# Patient Record
Sex: MALE | Race: WHITE | NOT HISPANIC OR LATINO | Employment: FULL TIME | URBAN - METROPOLITAN AREA
[De-identification: names, ages, dates, MRNs, and addresses within clinical notes are randomized per-mention and may not be internally consistent; named-entity substitution may affect disease eponyms.]

---

## 2017-01-31 ENCOUNTER — OFFICE VISIT (OUTPATIENT)
Dept: PSYCHIATRY | Facility: CLINIC | Age: 32
End: 2017-01-31
Payer: COMMERCIAL

## 2017-01-31 DIAGNOSIS — R69 DIAGNOSIS DEFERRED: Primary | ICD-10-CM

## 2017-01-31 PROCEDURE — 90834 PSYTX W PT 45 MINUTES: CPT | Mod: S$GLB,,, | Performed by: SOCIAL WORKER

## 2017-02-03 ENCOUNTER — OFFICE VISIT (OUTPATIENT)
Dept: INTERNAL MEDICINE | Facility: CLINIC | Age: 32
End: 2017-02-03

## 2017-02-03 VITALS
OXYGEN SATURATION: 98 % | WEIGHT: 250.69 LBS | HEIGHT: 69 IN | HEART RATE: 87 BPM | DIASTOLIC BLOOD PRESSURE: 98 MMHG | SYSTOLIC BLOOD PRESSURE: 154 MMHG | BODY MASS INDEX: 37.13 KG/M2

## 2017-02-03 DIAGNOSIS — I10 ESSENTIAL HYPERTENSION: ICD-10-CM

## 2017-02-03 DIAGNOSIS — F32.A DEPRESSION, UNSPECIFIED DEPRESSION TYPE: Primary | ICD-10-CM

## 2017-02-03 PROCEDURE — 99999 PR PBB SHADOW E&M-EST. PATIENT-LVL III: CPT | Mod: PBBFAC,,, | Performed by: INTERNAL MEDICINE

## 2017-02-03 PROCEDURE — 99204 OFFICE O/P NEW MOD 45 MIN: CPT | Mod: S$PBB,,, | Performed by: INTERNAL MEDICINE

## 2017-02-03 PROCEDURE — 99213 OFFICE O/P EST LOW 20 MIN: CPT | Mod: PBBFAC | Performed by: INTERNAL MEDICINE

## 2017-02-03 RX ORDER — BUPROPION HYDROCHLORIDE 150 MG/1
150 TABLET ORAL DAILY
Qty: 30 TABLET | Refills: 3 | Status: SHIPPED | OUTPATIENT
Start: 2017-02-03 | End: 2017-03-06 | Stop reason: SDUPTHER

## 2017-02-03 RX ORDER — AMLODIPINE BESYLATE 5 MG/1
5 TABLET ORAL DAILY
Qty: 30 TABLET | Refills: 3 | Status: SHIPPED | OUTPATIENT
Start: 2017-02-03 | End: 2017-03-06 | Stop reason: SDUPTHER

## 2017-02-03 NOTE — MR AVS SNAPSHOT
[facility]  [address] [facility] [facility] [address]  Phone: [phone]  Fax: [phone]                  Alpesh Lujan   2/3/2017 11:00 AM   Office Visit    Description:  Male : 1985   Provider:  Kristin Cash MD   Department:  [facility] Internal Medicine           Reason for Visit     Establish Care           Diagnoses this Visit        Comments    Essential hypertension    -  Primary     Depression, unspecified depression type                To Do List           Goals (5 Years of Data)     None       These Medications        Disp Refills Start End    amlodipine (NORVASC) 5 MG tablet 30 tablet 3 2/3/2017 3/5/2017    Take 1 tablet (5 mg total) by mouth once daily. - Oral    Pharmacy: [facility] - [place], [place] - [address] Ph #: [phone]       buPROPion (WELLBUTRIN XL) 150 MG TB24 tablet 30 tablet 3 2/3/2017 3/5/2017    Take 1 tablet (150 mg total) by mouth once daily. - Oral    Pharmacy: [facility] - [address] Ph #: [phone]         [facility] On [facility]     [facility] -  Assistance  Registered nurses in the [facility] provide clinical advisement, health education, appointment booking, and other advisory services.  Call for this free service at [phone].             Medications           Message regarding Medications     Verify the changes and/or additions to your medication regime listed below are the same as discussed with your clinician today.  If any of these changes or additions are incorrect, please notify your healthcare provider.        START taking these NEW medications        Refills    amlodipine (NORVASC) 5 MG tablet 3    Sig: Take 1 tablet (5 mg total) by mouth once daily.    Class: Normal    Route: Oral    buPROPion (WELLBUTRIN XL) 150 MG TB24 tablet 3    Sig: Take 1 tablet (150 mg total) by mouth once daily.    Class: Normal     "Route: Oral           Verify that the below list of medications is an accurate representation of the medications you are currently taking.  If none reported, the list may be blank. If incorrect, please contact your healthcare provider. Carry this list with you in case of emergency.           Current Medications     amlodipine (NORVASC) 5 MG tablet Take 1 tablet (5 mg total) by mouth once daily.    buPROPion (WELLBUTRIN XL) 150 MG TB24 tablet Take 1 tablet (150 mg total) by mouth once daily.           Clinical Reference Information           Your Vitals Were     BP Pulse Height Weight SpO2 BMI    154/98 87 5' 9" (1.753 m) 113.7 kg (250 lb 10.6 oz) 98% 37.02 kg/m2      Blood Pressure          Most Recent Value    BP  (!)  154/98      Allergies as of 2/3/2017     Pcn [Penicillins]      Immunizations Administered on Date of Encounter - 2/3/2017     None      [facility] Sign-Up     Activating your [facility] account is as easy as 1-2-3!     1) Visit [email or web address], select Sign Up Now, enter this activation code and your date of birth, then select Next.  [email or web address]  Expires: 3/4/2017  3:07 PM      2) Create a username and password to use when you visit [facility] in the future and select a security question in case you lose your password and select Next.    3) Enter your e-mail address and click Sign Up!    Additional Information  If you have questions, please e-mail [email or web address] or call [phone] to talk to our [facility] staff. Remember, [facility] is NOT to be used for urgent needs. For medical emergencies, dial 911.         Language Assistance Services     ATTENTION: Language assistance services are available, free of charge. Please call [phone].      ATENCIÓN: Si habla byron, tiene a clark disposición servicios gratuitos de asistencia lingüística. Llame al [phone].     CHÚ Ý: N?u b?n nói Ti?ng Vi?t, có các d?ch v? h? tr? ngôn ng? mi?n phí dành cho b?n. G?i s? [phone].         [facility] complies with applicable Federal civil rights laws and does not discriminate on the basis of race, color, national origin, age, disability, or sex.

## 2017-02-03 NOTE — PROGRESS NOTES
Internal Medicine    Subjective:      Patient ID: Alpesh Lujan is a 31 y.o. male.    Chief Complaint: Establish Care    HPI Comments: Presents to establish care.  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     Depression:  Patient has experienced recent depression after break-up with fiance and then failing the step 1 exam.  He endorses depressed mood, anhedonia, insomnia (no difficulty falling asleep, but difficulty staying asleep due to nightmares regarding current stressors), increased appetite with 30 lb weight gain, poor concentration (mostly only when outside of the hospital; functioning well inside the hospital), frequent ruminating, and hopelessness.  He has had times where he wishes everything would just end, however he has never had thoughts of harming himself - he states he could never do this to his family.  He occasionally feels anxious but does not feel this is a major problem for him - not having panic attacks or frequent worrying/catastrophizing.  Mother with depression and anxiety - previously on SSRI which was not very helpful.  Maternal grandmother and great-grandfather with depression.  Paternal aunt with bipolar disorder.  No family history of suicide.    HTN:  Patient reports persistently high BP's on his physicals during the last few years - usually 140s -150s systolic.  He is interested in starting medication for this.      R/o DEIDRE:  Patient concerned he has DEIDRE because he snores, has high blood pressure, and his parents have this.      Review of Systems   Constitutional: Negative for chills, fatigue and fever.   HENT: Negative for congestion, sore throat and trouble swallowing.    Eyes: Negative for visual disturbance.   Respiratory: Negative for cough and shortness of breath.    Cardiovascular: Negative for chest pain and palpitations.   Gastrointestinal: Negative for abdominal pain, constipation and diarrhea.   Genitourinary: Negative for dysuria and urgency.   Musculoskeletal: Negative for arthralgias  "and myalgias.   Skin: Negative for rash.   Neurological: Negative for dizziness, seizures, weakness, numbness and headaches.   Hematological: Negative for adenopathy.   Psychiatric/Behavioral: Negative for suicidal ideas.        As per HPI       Past Medical History   Diagnosis Date    Depression      Past Surgical History   Procedure Laterality Date    Knee surgery Right      x2     Family History   Problem Relation Age of Onset    Anxiety disorder Mother     Depression Mother     Obesity Mother     Hypertension Father     Diabetes Father     Hyperlipidemia Father     Obesity Father     Anxiety disorder Sister     Hypertension Sister     Obesity Sister     Dementia Paternal Grandmother     Stroke Paternal Grandfather     Heart disease Paternal Grandfather      Social History   Substance Use Topics    Smoking status: Never Smoker    Smokeless tobacco: Never Used    Alcohol use Yes      Comment: 2-3 drinks per night, 6-12 pack on weekends       Medications and allergies reviewed.     Objective:     Visit Vitals    BP (!) 154/98    Pulse 87    Ht 5' 9" (1.753 m)    Wt 113.7 kg (250 lb 10.6 oz)    SpO2 98%    BMI 37.02 kg/m2     Physical Exam   Constitutional: He is oriented to person, place, and time. He appears well-developed and well-nourished. No distress.   HENT:   Head: Normocephalic and atraumatic.   Eyes: Conjunctivae and EOM are normal. No scleral icterus.   Cardiovascular: Normal rate and regular rhythm.  Exam reveals no gallop and no friction rub.    No murmur heard.  Pulmonary/Chest: Effort normal and breath sounds normal. No respiratory distress. He has no wheezes. He has no rales.   Musculoskeletal: He exhibits no edema.   Neurological: He is alert and oriented to person, place, and time. No cranial nerve deficit.   Skin: No rash noted.   Psychiatric:   Mood is "down"; Affect congruent.  Thoughts linear, speech normal, no suicidal ideation.         Assessment:     1. Depression, " unspecified depression type    2. Essential hypertension        Plan:   Alpesh was seen today for establish care.    Diagnoses and all orders for this visit:    Depression, unspecified depression type  -     buPROPion (WELLBUTRIN XL) 150 MG TB24 tablet; Take 1 tablet (150 mg total) by mouth once daily.    Essential hypertension  -     amlodipine (NORVASC) 5 MG tablet; Take 1 tablet (5 mg total) by mouth once daily.    Patient would like to defer annual physical with routine labs due to not having insurance currently.      Return in 2 weeks (on 2/16/2017).    Kristin Cash MD  Internal Medicine  [facility]

## 2017-02-16 ENCOUNTER — OFFICE VISIT (OUTPATIENT)
Dept: INTERNAL MEDICINE | Facility: CLINIC | Age: 32
End: 2017-02-16
Payer: COMMERCIAL

## 2017-02-16 VITALS
HEIGHT: 66 IN | SYSTOLIC BLOOD PRESSURE: 145 MMHG | HEART RATE: 86 BPM | OXYGEN SATURATION: 98 % | TEMPERATURE: 98 F | BODY MASS INDEX: 40.32 KG/M2 | WEIGHT: 250.88 LBS | DIASTOLIC BLOOD PRESSURE: 96 MMHG

## 2017-02-16 DIAGNOSIS — F33.1 MODERATE EPISODE OF RECURRENT MAJOR DEPRESSIVE DISORDER: ICD-10-CM

## 2017-02-16 DIAGNOSIS — I10 ESSENTIAL HYPERTENSION: Primary | ICD-10-CM

## 2017-02-16 DIAGNOSIS — F41.9 ANXIETY: ICD-10-CM

## 2017-02-16 PROCEDURE — 99213 OFFICE O/P EST LOW 20 MIN: CPT | Mod: S$GLB,,, | Performed by: INTERNAL MEDICINE

## 2017-02-16 PROCEDURE — 99999 PR PBB SHADOW E&M-EST. PATIENT-LVL III: CPT | Mod: PBBFAC,,, | Performed by: INTERNAL MEDICINE

## 2017-02-16 PROCEDURE — 3077F SYST BP >= 140 MM HG: CPT | Mod: S$GLB,,, | Performed by: INTERNAL MEDICINE

## 2017-02-16 PROCEDURE — 3080F DIAST BP >= 90 MM HG: CPT | Mod: S$GLB,,, | Performed by: INTERNAL MEDICINE

## 2017-02-16 NOTE — PROGRESS NOTES
"Internal Medicine    Subjective:      Patient ID: Pollo Lujan is a 31 y.o. male.    Chief Complaint: Follow-up    HPI Comments: Presents for follow up appointment.  Last seen 2 weeks ago.    Less anxiety, sleeping better.  Side effects makes him drowsy.  Also has had cold.  Sleepy but not tired.  Likes the claming effect.  Meeting with advisor next week.  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  Earliest would be mid to late March.  Doing well at work.  Mood is about the same.      Depression, anxiety:  Seen 2 weeks ago and started on Wellbutrin 150mg daily.  Had trouble with insurance and just got medication 6 days ago.  States that it is making him "sleepy but not tired."  He does feel less anxiety and is sleeping better.  He denies changes in his mood.  He denies suicidal thoughts.  He is still unsure when he will be taking the Step again.  Meets with advisor tomorrow.  Feels he is doing well at work.  Studying at night.      HTN:  Started on Norvasc 5mg daily at last appointment - started medication 6 days ago.  BP still not at goal.      R/o DEIDRE:  Patient concerned he has DEIDRE because he snores, has high blood pressure, and his parents have this.      Review of Systems   Constitutional: Positive for fatigue. Negative for chills and fever.   Eyes: Negative for visual disturbance.   Respiratory: Negative for shortness of breath.    Cardiovascular: Negative for chest pain.   Gastrointestinal: Negative for abdominal pain.   Neurological: Negative for headaches.   Psychiatric/Behavioral: Negative for sleep disturbance and suicidal ideas. The patient is not nervous/anxious.        Past medical history, surgical history, and family medical history reviewed and updated as appropriate.    Medications and allergies reviewed.     Objective:     Visit Vitals    BP (!) 145/96    Pulse 86    Temp 98 °F (36.7 °C) (Oral)    Ht 5' 6" (1.676 m)    Wt 113.8 kg (250 lb 14.1 oz)    SpO2 98%    BMI 40.49 kg/m2 "     Physical Exam   Constitutional: He is oriented to person, place, and time. He appears well-developed and well-nourished. No distress.   HENT:   Head: Normocephalic and atraumatic.   Eyes: Conjunctivae and EOM are normal.   Cardiovascular: Normal rate and regular rhythm.  Exam reveals no gallop and no friction rub.    No murmur heard.  Pulmonary/Chest: Effort normal and breath sounds normal. No respiratory distress. He has no wheezes. He has no rales.   Musculoskeletal: He exhibits no edema.   Neurological: He is alert and oriented to person, place, and time.   Psychiatric:   Depressed mood, congruent affect.  Normal speech, linear thoughts.  No suicidal ideation.       Assessment:     1. Essential hypertension    2. Moderate episode of recurrent major depressive disorder    3. Anxiety        Plan:   Pollo was seen today for follow-up.    Diagnoses and all orders for this visit:    Essential hypertension   Continue Norvasc 5mg daily for now - consider increasing at next appointment.    Moderate episode of recurrent major depressive disorder  Anxiety   Anxiety improved.  Continue Wellbutrin 150mg daily.  Can consider increasing at next appointment.      Return in about 3 weeks (around 3/6/2017).    Kristin Cash MD  Internal Medicine  [facility]

## 2017-02-16 NOTE — MR AVS SNAPSHOT
[facility] - Internal Medicine  [address]  [facility] [address]  Phone: [phone]  Fax: [phone]                  Pollo Lujan   2017 11:00 AM   Office Visit    Description:  Male : 1985   Provider:  Kristin Cash MD   Department:  [facility] - Internal Medicine           Reason for Visit     Follow-up           Diagnoses this Visit        Comments    Essential hypertension    -  Primary     Moderate episode of recurrent major depressive disorder         Anxiety                To Do List           Future Appointments        Provider Department Dept Phone    3/6/2017 9:40 AM Kristin Cash MD [facility] - Internal Medicine [phone]      Goals (5 Years of Data)     None      Follow-Up and Disposition     Return in about 3 weeks (around 3/6/2017).    Follow-up and Disposition History      [facility]     [facility] Care [facility] -  Assistance  Registered nurses in the [facility] provide clinical advisement, health education, appointment booking, and other advisory services.  Call for this free service at [phone].             Medications           Message regarding Medications     Verify the changes and/or additions to your medication regime listed below are the same as discussed with your clinician today.  If any of these changes or additions are incorrect, please notify your healthcare provider.             Verify that the below list of medications is an accurate representation of the medications you are currently taking.  If none reported, the list may be blank. If incorrect, please contact your healthcare provider. Carry this list with you in case of emergency.           Current Medications     amlodipine (NORVASC) 5 MG tablet Take 1 tablet (5 mg total) by mouth once daily.    buPROPion (WELLBUTRIN XL) 150 MG TB24 tablet Take 1 tablet (150 mg total) by mouth once daily.           Clinical Reference Information           Your Vitals Were      "BP Pulse Temp Height Weight SpO2    145/96 86 98 °F (36.7 °C) (Oral) 5' 6" (1.676 m) 113.8 kg (250 lb 14.1 oz) 98%    BMI                40.49 kg/m2          Blood Pressure          Most Recent Value    BP  (!)  145/96      Allergies as of 2/16/2017     Pcn [Penicillins]      Immunizations Administered on Date of Encounter - 2/16/2017     None      Language Assistance Services     ATTENTION: Language assistance services are available, free of charge. Please call [phone].      ATENCIÓN: Si habla español, tiene a clark disposición servicios gratuitos de asistencia lingüística. Llame al [phone].     KELVIN Ý: N?u b?n nói Ti?ng Vi?t, có các d?ch v? h? tr? ngôn ng? mi?n phí dành cho b?n. G?i s? [phone].         [facility] complies with applicable Federal civil rights laws and does not discriminate on the basis of race, color, national origin, age, disability, or sex.        "

## 2017-02-21 ENCOUNTER — OFFICE VISIT (OUTPATIENT)
Dept: PSYCHIATRY | Facility: CLINIC | Age: 32
End: 2017-02-21
Payer: COMMERCIAL

## 2017-02-21 DIAGNOSIS — R69 DIAGNOSIS DEFERRED: ICD-10-CM

## 2017-02-21 PROCEDURE — 90834 PSYTX W PT 45 MINUTES: CPT | Mod: S$GLB,,, | Performed by: SOCIAL WORKER

## 2017-03-02 ENCOUNTER — PATIENT MESSAGE (OUTPATIENT)
Dept: INTERNAL MEDICINE | Facility: CLINIC | Age: 32
End: 2017-03-02

## 2017-03-02 ENCOUNTER — PATIENT MESSAGE (OUTPATIENT)
Dept: PSYCHIATRY | Facility: CLINIC | Age: 32
End: 2017-03-02

## 2017-03-02 NOTE — LETTER
March 3, 2017    Lakeshia Lujan  [address] [place] [id number]             [facility]  [address]  [facility] [address]  Phone: [phone]  Fax: [phone] To whom it may concern:    I am currently treating Lakeshia Lujan at [facility] for depression.  I have started him on Wellbutrin and am seeing him regularly for medication titrations.  He is also participating in psychotherapy.  I believe that it is in his best interest to take the next rotation off so that we can work on getting him better.  If you have any questions or concerns, please don't hesitate to call.    Sincerely,        Kristin Cash MD

## 2017-03-06 ENCOUNTER — OFFICE VISIT (OUTPATIENT)
Dept: INTERNAL MEDICINE | Facility: CLINIC | Age: 32
End: 2017-03-06
Payer: COMMERCIAL

## 2017-03-06 ENCOUNTER — OFFICE VISIT (OUTPATIENT)
Dept: PSYCHIATRY | Facility: CLINIC | Age: 32
End: 2017-03-06
Payer: COMMERCIAL

## 2017-03-06 VITALS
WEIGHT: 246.25 LBS | OXYGEN SATURATION: 98 % | DIASTOLIC BLOOD PRESSURE: 95 MMHG | HEART RATE: 89 BPM | TEMPERATURE: 98 F | BODY MASS INDEX: 36.47 KG/M2 | SYSTOLIC BLOOD PRESSURE: 145 MMHG | HEIGHT: 69 IN

## 2017-03-06 DIAGNOSIS — I10 ESSENTIAL HYPERTENSION: Primary | ICD-10-CM

## 2017-03-06 DIAGNOSIS — R69 DIAGNOSIS DEFERRED: Primary | ICD-10-CM

## 2017-03-06 DIAGNOSIS — F33.1 MODERATE EPISODE OF RECURRENT MAJOR DEPRESSIVE DISORDER: ICD-10-CM

## 2017-03-06 PROCEDURE — 1160F RVW MEDS BY RX/DR IN RCRD: CPT | Mod: S$GLB,,, | Performed by: INTERNAL MEDICINE

## 2017-03-06 PROCEDURE — 99999 PR PBB SHADOW E&M-EST. PATIENT-LVL III: CPT | Mod: PBBFAC,,, | Performed by: INTERNAL MEDICINE

## 2017-03-06 PROCEDURE — 99213 OFFICE O/P EST LOW 20 MIN: CPT | Mod: S$GLB,,, | Performed by: INTERNAL MEDICINE

## 2017-03-06 PROCEDURE — 3077F SYST BP >= 140 MM HG: CPT | Mod: S$GLB,,, | Performed by: INTERNAL MEDICINE

## 2017-03-06 PROCEDURE — 90834 PSYTX W PT 45 MINUTES: CPT | Mod: S$GLB,,, | Performed by: SOCIAL WORKER

## 2017-03-06 PROCEDURE — 3080F DIAST BP >= 90 MM HG: CPT | Mod: S$GLB,,, | Performed by: INTERNAL MEDICINE

## 2017-03-06 RX ORDER — BUPROPION HYDROCHLORIDE 150 MG/1
150 TABLET ORAL DAILY
COMMUNITY
End: 2017-03-06

## 2017-03-06 RX ORDER — BUPROPION HYDROCHLORIDE 150 MG/1
150 TABLET ORAL DAILY
Qty: 90 TABLET | Refills: 0 | Status: SHIPPED | OUTPATIENT
Start: 2017-03-06 | End: 2017-04-20

## 2017-03-06 RX ORDER — AMLODIPINE BESYLATE 10 MG/1
10 TABLET ORAL DAILY
Qty: 90 TABLET | Refills: 0 | Status: SHIPPED | OUTPATIENT
Start: 2017-03-06 | End: 2017-04-05

## 2017-03-06 RX ORDER — AMLODIPINE BESYLATE 5 MG/1
5 TABLET ORAL DAILY
COMMUNITY
End: 2017-03-06

## 2017-03-06 NOTE — MR AVS SNAPSHOT
[facility] Internal Medicine  [address]  [place] [address]  Phone: [phone]  Fax: [phone]                  Pollo Lujan   3/6/2017 9:40 AM   Office Visit    Description:  Male : 1985   Provider:  Kristin Cash MD   Department:  [facility] - Internal Medicine           Reason for Visit     Follow-up           Diagnoses this Visit        Comments    Essential hypertension         Depression, unspecified depression type                To Do List           Future Appointments        Provider Department Dept Phone    2017 9:40 AM Kristin Cash MD [facility] Internal Medicine [phone]      Goals (5 Years of Data)     None      Follow-Up and Disposition     Return in about 6 weeks (around 2017).       These Medications        Disp Refills Start End    amlodipine (NORVASC) 10 MG tablet 90 tablet 0 3/6/2017 2017    Take 1 tablet (10 mg total) by mouth once daily. - Oral    Pharmacy: [facility] - [address] Ph #: [phone]       buPROPion (WELLBUTRIN XL) 150 MG TB24 tablet 90 tablet 0 3/6/2017 2017    Take 1 tablet (150 mg total) by mouth once daily. - Oral    Pharmacy: [facility] - [address] #: [phone]         [facility]     [facility] Care Line -  Assistance  Registered nurses in the [facility] provide clinical advisement, health education, appointment booking, and other advisory services.  Call for this free service at [phone].             Medications           Message regarding Medications     Verify the changes and/or additions to your medication regime listed below are the same as discussed with your clinician today.  If any of these changes or additions are incorrect, please notify your healthcare provider.        CHANGE how you are taking these medications     Start Taking Instead of    amlodipine (NORVASC) 10  "MG tablet amlodipine (NORVASC) 5 MG tablet    Dosage:  Take 1 tablet (10 mg total) by mouth once daily. Dosage:  Take 1 tablet (5 mg total) by mouth once daily.    Reason for Change:  Reorder            Verify that the below list of medications is an accurate representation of the medications you are currently taking.  If none reported, the list may be blank. If incorrect, please contact your healthcare provider. Carry this list with you in case of emergency.           Current Medications     amlodipine (NORVASC) 10 MG tablet Take 1 tablet (10 mg total) by mouth once daily.    buPROPion (WELLBUTRIN XL) 150 MG TB24 tablet Take 1 tablet (150 mg total) by mouth once daily.           Clinical Reference Information           Your Vitals Were     BP Pulse Temp Height Weight SpO2    120/80 89 97.9 °F (36.6 °C) 5' 9" (1.753 m) 111.7 kg (246 lb 4.1 oz) 98%    BMI                36.37 kg/m2          Blood Pressure          Most Recent Value    BP  120/80      Allergies as of 3/6/2017     Pcn [Penicillins]      Immunizations Administered on Date of Encounter - 3/6/2017     None      Language Assistance Services     ATTENTION: Language assistance services are available, free of charge. Please call [phone].      ATENCIÓN: Si habla jessicaañol, tiene a clark disposición servicios gratuitos de asistencia lingüística. Llame al [phone].     KELVIN Ý: N?u b?n nói Ti?ng Vi?t, có các d?ch v? h? tr? ngôn ng? mi?n phí dành cho b?n. G?i s? [phone].         [facility] complies with applicable Federal civil rights laws and does not discriminate on the basis of race, color, national origin, age, disability, or sex.        "

## 2017-03-06 NOTE — PROGRESS NOTES
"Internal Medicine    Subjective:      Patient ID: Pollo Lujan is a 31 y.o. male.    Chief Complaint: Follow-up    HPI Comments: Presents for follow up appointment of depression.      Depression:  Started on Wellbutrin 150mg daily - was making him sleepy at last appointment however he had only been taking for 6 days.  Reports mood is better and he has much better focus on his work.  He reports a "huge improvement" in his symptoms.  Says he has not felt this good in over 1 year.  Has been doing things outside of school for the first time in a long time.  Has rare passive SI with no intention to act on these thoughts.  Still participating in therapy - has 3 more visits with Nimco Ponce.  Appointment today at 5pm.  Leaving next week for Step 1 course in [place] x 1 month.  Step 1 will be early May.  Out until May 15th.      Insomnia:  Sleeps 5-6 hours.  Wakes up at 3:30am and tosses and turns for 2 hours and then gets up.  Tried melatonin and but not helpful.  Not interested in prescription medication for sleep.    HTN:  Taking Norvasc 5mg daily.    R/o DEIDRE:  Snores, has high blood pressure, and both parents have DEIDRE.  Unable to afford sleep study at this time.      Review of Systems   Constitutional: Negative for chills, fatigue, fever and unexpected weight change.   HENT: Negative for hearing loss and trouble swallowing.    Eyes: Negative for visual disturbance.   Respiratory: Negative for cough, chest tightness and shortness of breath.    Cardiovascular: Negative for chest pain, palpitations and leg swelling.   Gastrointestinal: Negative for abdominal pain.   Genitourinary: Negative for difficulty urinating.   Musculoskeletal: Negative for arthralgias.   Skin: Negative for rash.   Neurological: Negative for dizziness, weakness and headaches.   Psychiatric/Behavioral:        As per HPI.       Past medical history, surgical history, and family medical history reviewed and updated as appropriate.    Medications and " "allergies reviewed.     Objective:     BP (!) 145/95  Pulse 89  Temp 97.9 °F (36.6 °C)  Ht 5' 9" (1.753 m)  Wt 111.7 kg (246 lb 4.1 oz)  SpO2 98%  BMI 36.37 kg/m2  Physical Exam   Constitutional: He is oriented to person, place, and time. He appears well-developed and well-nourished. No distress.   HENT:   Head: Normocephalic and atraumatic.   Eyes: Conjunctivae and EOM are normal.   Cardiovascular: Normal rate and regular rhythm.  Exam reveals no gallop and no friction rub.    No murmur heard.  Pulmonary/Chest: Effort normal and breath sounds normal. No respiratory distress. He has no wheezes. He has no rales.   Musculoskeletal: He exhibits no edema or tenderness.   Neurological: He is alert and oriented to person, place, and time.   Skin: No rash noted.   Psychiatric:   Mood is "much better"; Affect somewhat constricted but improved.         Assessment:     1. Essential hypertension    2. Moderate episode of recurrent major depressive disorder        Plan:   Pollo was seen today for follow-up.    Diagnoses and all orders for this visit:    Essential hypertension   Increase Norvasc to 10mg daily.  -     amlodipine (NORVASC) 10 MG tablet; Take 1 tablet (10 mg total) by mouth once daily.    MDD, moderate  -     buPROPion (WELLBUTRIN XL) 150 MG TB24 tablet; Take 1 tablet (150 mg total) by mouth once daily.    Return in about 6 weeks (around 4/17/2017).    Kristin Cash MD  Internal Medicine  [facility]  "

## 2017-03-12 PROBLEM — F41.9 ANXIETY: Status: RESOLVED | Noted: 2017-02-16 | Resolved: 2017-03-12

## 2017-03-12 PROBLEM — F33.1 MODERATE EPISODE OF RECURRENT MAJOR DEPRESSIVE DISORDER: Status: RESOLVED | Noted: 2017-02-16 | Resolved: 2017-03-12

## 2017-03-12 PROBLEM — R69 DIAGNOSIS DEFERRED: Status: RESOLVED | Noted: 2017-02-21 | Resolved: 2017-03-12

## 2017-03-12 PROBLEM — F32.A DEPRESSION: Status: ACTIVE | Noted: 2017-03-12

## 2017-04-20 ENCOUNTER — OFFICE VISIT (OUTPATIENT)
Dept: INTERNAL MEDICINE | Facility: CLINIC | Age: 32
End: 2017-04-20
Payer: COMMERCIAL

## 2017-04-20 VITALS
HEART RATE: 80 BPM | WEIGHT: 255.06 LBS | BODY MASS INDEX: 37.78 KG/M2 | DIASTOLIC BLOOD PRESSURE: 100 MMHG | SYSTOLIC BLOOD PRESSURE: 155 MMHG | HEIGHT: 69 IN

## 2017-04-20 DIAGNOSIS — I10 ESSENTIAL HYPERTENSION: ICD-10-CM

## 2017-04-20 DIAGNOSIS — F32.A DEPRESSION, UNSPECIFIED DEPRESSION TYPE: ICD-10-CM

## 2017-04-20 DIAGNOSIS — Z00.00 ANNUAL PHYSICAL EXAM: Primary | ICD-10-CM

## 2017-04-20 PROCEDURE — 3077F SYST BP >= 140 MM HG: CPT | Mod: S$GLB,,, | Performed by: INTERNAL MEDICINE

## 2017-04-20 PROCEDURE — 99999 PR PBB SHADOW E&M-EST. PATIENT-LVL III: CPT | Mod: PBBFAC,,, | Performed by: INTERNAL MEDICINE

## 2017-04-20 PROCEDURE — 99213 OFFICE O/P EST LOW 20 MIN: CPT | Mod: S$GLB,,, | Performed by: INTERNAL MEDICINE

## 2017-04-20 PROCEDURE — 3078F DIAST BP <80 MM HG: CPT | Mod: S$GLB,,, | Performed by: INTERNAL MEDICINE

## 2017-04-20 PROCEDURE — 1160F RVW MEDS BY RX/DR IN RCRD: CPT | Mod: S$GLB,,, | Performed by: INTERNAL MEDICINE

## 2017-04-20 RX ORDER — LISINOPRIL 20 MG/1
20 TABLET ORAL DAILY
Qty: 30 TABLET | Refills: 2 | Status: SHIPPED | OUTPATIENT
Start: 2017-04-20 | End: 2017-05-20

## 2017-04-20 NOTE — MR AVS SNAPSHOT
[facility]  [address]  [place] [address]  Phone: [phone]  Fax: [phone]                  Lakeshia Lujan   2017 9:40 AM   Office Visit    Description:  Male : 1985   Provider:  Kristin Cash MD   Department:  [facility]           Reason for Visit     Follow-up           Diagnoses this Visit        Comments    Essential hypertension    -  Primary     Depression, unspecified depression type         Annual physical exam                To Do List           Future Appointments        Provider Department Dept Phone    2017 7:00 AM LAB, APPOINTMENT [facility] [phone]    2017 4:20 PM Krsitin Cash MD [facility] Internal Medicine [phone]      Goals (5 Years of Data)     None      Follow-Up and Disposition     Return in about 4 weeks (around 2017).       These Medications        Disp Refills Start End    lisinopril (PRINIVIL,ZESTRIL) 20 MG tablet 30 tablet 2 2017    Take 1 tablet (20 mg total) by mouth once daily. - Oral    Pharmacy: [facility] [address] Ph #: [phone]         [facility]     [facility] -  Assistance  Unless otherwise directed by your provider, please contact [facility], our nurse care line that is available for  assistance.     Registered nurses in the [facility] provide: appointment scheduling, clinical advisement, health education, and other advisory services.  Call: [phone] (toll free)               Medications           Message regarding Medications     Verify the changes and/or additions to your medication regime listed below are the same as discussed with your clinician today.  If any of these changes or additions are incorrect, please notify your healthcare provider.        START taking these NEW medications        Refills    lisinopril  "(PRINIVIL,ZESTRIL) 20 MG tablet 2    Sig: Take 1 tablet (20 mg total) by mouth once daily.    Class: Normal    Route: Oral           Verify that the below list of medications is an accurate representation of the medications you are currently taking.  If none reported, the list may be blank. If incorrect, please contact your healthcare provider. Carry this list with you in case of emergency.           Current Medications     buPROPion (WELLBUTRIN XL) 150 MG TB24 tablet Take 1 tablet (150 mg total) by mouth once daily.    amlodipine (NORVASC) 10 MG tablet Take 1 tablet (10 mg total) by mouth once daily.    lisinopril (PRINIVIL,ZESTRIL) 20 MG tablet Take 1 tablet (20 mg total) by mouth once daily.           Clinical Reference Information           Your Vitals Were     BP Pulse Height Weight BMI    140/84 80 5' 9" (1.753 m) 115.7 kg (255 lb 1.2 oz) 37.67 kg/m2      Blood Pressure          Most Recent Value    BP  (!)  140/84      Allergies as of 4/20/2017     Pcn [Penicillins]      Immunizations Administered on Date of Encounter - 4/20/2017     None      Orders Placed During Today's Visit     Future Labs/Procedures Expected by Expires    CBC auto differential  4/20/2017 6/19/2018    Comprehensive metabolic panel  4/20/2017 6/19/2018    Hemoglobin A1c  4/20/2017 6/19/2018    Lipid panel  4/20/2017 6/19/2018    TSH  4/20/2017 6/19/2018      Language Assistance Services     ATTENTION: Language assistance services are available, free of charge. Please call [phone].      ATENCIÓN: Si habla byron, tiene a clark disposición servicios gratuitos de asistencia lingüística. Llame al [phone].     [facility] Ý: N?u b?n nói Ti?ng Vi?t, có các d?ch v? h? tr? ngôn ng? mi?n phí dành cho b?n. G?i s? [phone].         [facility] complies with applicable Federal civil rights laws and does not discriminate on the basis of race, color, national origin, age, disability, or sex.        "

## 2017-04-20 NOTE — PROGRESS NOTES
"Internal Medicine    Subjective:      Patient ID: Lakeshia Lujan is a 31 y.o. male.    Chief Complaint: Follow-up    HPI Comments: Presents for follow up appointment and annual labs.  Last seen 3/6/17.    Depression:  Has been on Wellbutrin 150mg daily since 3/1/17.  At last appointment reported mood improved and better focus on his work.  Continues to report Wellbutrin is significantly helpful for concentration and mood.  Denies symptoms of depression.  He takes the Step in a few weeks.  Concentrating well and able to get things done.  Denies SI.      Insomnia:  Has been sleeping 5-6 hours - not wanting medication.    HTN:  Norvasc increased to 10mg daily at last appointment.  Patient has now stopped Norvasc due to some mild ankle swelling.      R/o DEIDRE:  Snores, has high blood pressure, and both parents have DEIDRE.  Unable to afford sleep study at this time.        Review of Systems   Constitutional: Negative for appetite change, chills, fatigue, fever and unexpected weight change.   Respiratory: Negative for cough, chest tightness, shortness of breath and wheezing.    Cardiovascular: Negative for chest pain, palpitations and leg swelling.   Gastrointestinal: Negative for abdominal pain, blood in stool, constipation, diarrhea, nausea and vomiting.   Genitourinary: Negative for difficulty urinating.   Musculoskeletal: Negative for arthralgias and myalgias.   Skin: Negative for rash.   Neurological: Negative for dizziness, weakness, numbness and headaches.   Psychiatric/Behavioral: Negative for behavioral problems, dysphoric mood and suicidal ideas. The patient is not nervous/anxious.        Past medical history, surgical history, and family medical history reviewed and updated as appropriate.    Medications and allergies reviewed.     Objective:     BP (!) 155/100  Pulse 80  Ht 5' 9" (1.753 m)  Wt 115.7 kg (255 lb 1.2 oz)  BMI 37.67 kg/m2  Physical Exam   Constitutional: He is oriented to person, place, and time. " He appears well-developed and well-nourished. No distress.   HENT:   Head: Normocephalic and atraumatic.   Eyes: Conjunctivae and EOM are normal.   Cardiovascular: Normal rate and regular rhythm.  Exam reveals no gallop and no friction rub.    No murmur heard.  Pulmonary/Chest: Effort normal and breath sounds normal. No respiratory distress. He has no wheezes. He has no rales.   Musculoskeletal: He exhibits no edema.   Neurological: He is alert and oriented to person, place, and time.   Skin: No rash noted.   Psychiatric: He has a normal mood and affect. His behavior is normal.       RESULTS:  No labs - unable to afford until after 5/15/17.    Assessment:     1. Annual physical exam    2. Depression, unspecified depression type    3. Essential hypertension        Plan:   Lakeshia was seen today for follow-up.    Diagnoses and all orders for this visit:    Annual physical exam  -     CBC auto differential; Future; Expected date: 4/20/17  -     Comprehensive metabolic panel; Future; Expected date: 4/20/17  -     Hemoglobin A1c; Future; Expected date: 4/20/17  -     Lipid panel; Future; Expected date: 4/20/17  -     TSH; Future; Expected date: 4/20/17    Depression, unspecified depression type   Continue Wellbutrin 150mg daily.    Essential hypertension   Discontinue Norvasc due to swelling.  Start lisinopril 20mg daily.  Labs prior to next visit - unable to afford until after 5/15/17.  -     lisinopril (PRINIVIL,ZESTRIL) 20 MG tablet; Take 1 tablet (20 mg total) by mouth once daily.    Return in about 4 weeks (around 5/18/2017).    Kristin Cash MD  Internal Medicine  [facility]

## 2018-11-05 ENCOUNTER — PATIENT OUTREACH (OUTPATIENT)
Dept: ADMINISTRATIVE | Facility: HOSPITAL | Age: 33
End: 2018-11-05

## 2018-11-05 NOTE — PROGRESS NOTES
TerraPPDainer message sent to pt regarding schedule follow up with PCP. Pt last seen 4/2017 and b/p was uncontrolled at that time.